# Patient Record
Sex: FEMALE | Race: WHITE | URBAN - METROPOLITAN AREA
[De-identification: names, ages, dates, MRNs, and addresses within clinical notes are randomized per-mention and may not be internally consistent; named-entity substitution may affect disease eponyms.]

---

## 2022-11-04 ENCOUNTER — OFFICE (OUTPATIENT)
Dept: URBAN - METROPOLITAN AREA CLINIC 105 | Facility: CLINIC | Age: 67
Setting detail: OPHTHALMOLOGY
End: 2022-11-04
Payer: MEDICARE

## 2022-11-04 DIAGNOSIS — H40.1131: ICD-10-CM

## 2022-11-04 DIAGNOSIS — H25.13: ICD-10-CM

## 2022-11-04 PROCEDURE — 92133 CPTRZD OPH DX IMG PST SGM ON: CPT | Performed by: OPHTHALMOLOGY

## 2022-11-04 PROCEDURE — 99213 OFFICE O/P EST LOW 20 MIN: CPT | Performed by: OPHTHALMOLOGY

## 2022-11-04 PROCEDURE — 92083 EXTENDED VISUAL FIELD XM: CPT | Performed by: OPHTHALMOLOGY

## 2022-11-04 ASSESSMENT — VISUAL ACUITY
OS_BCVA: 20/30+1
OD_BCVA: 20/30

## 2022-11-04 ASSESSMENT — AXIALLENGTH_DERIVED
OS_AL: 24.9253
OD_AL: 25.3647

## 2022-11-04 ASSESSMENT — REFRACTION_AUTOREFRACTION
OD_SPHERE: -5.00
OS_CYLINDER: -0.75
OD_CYLINDER: -2.00
OD_AXIS: 105
OS_SPHERE: -4.75
OS_AXIS: 093

## 2022-11-04 ASSESSMENT — REFRACTION_CURRENTRX
OD_CYLINDER: -0.75
OS_VPRISM_DIRECTION: SV
OD_OVR_VA: 20/
OD_SPHERE: -4.50
OS_CYLINDER: -0.25
OS_OVR_VA: 20/
OS_SPHERE: -4.75
OS_AXIS: 011
OD_AXIS: 120
OD_VPRISM_DIRECTION: SV

## 2022-11-04 ASSESSMENT — PACHYMETRY
OD_CT_UM: 498
OS_CT_CORRECTION: 4
OS_CT_UM: 487
OD_CT_CORRECTION: 4

## 2022-11-04 ASSESSMENT — KERATOMETRY
OS_K1POWER_DIOPTERS: 45.25
OS_AXISANGLE_DEGREES: 132
OD_K2POWER_DIOPTERS: 45.50
OS_K2POWER_DIOPTERS: 45.75
OD_AXISANGLE_DEGREES: 052
OD_K1POWER_DIOPTERS: 45.25

## 2022-11-04 ASSESSMENT — TONOMETRY
OS_IOP_MMHG: 21
OD_IOP_MMHG: 21

## 2022-11-04 ASSESSMENT — CONFRONTATIONAL VISUAL FIELD TEST (CVF)
OD_FINDINGS: FULL
OS_FINDINGS: FULL

## 2022-11-04 ASSESSMENT — SPHEQUIV_DERIVED
OS_SPHEQUIV: -5.125
OD_SPHEQUIV: -6

## 2023-03-03 ENCOUNTER — OFFICE (OUTPATIENT)
Dept: URBAN - METROPOLITAN AREA CLINIC 12 | Facility: CLINIC | Age: 68
Setting detail: OPHTHALMOLOGY
End: 2023-03-03
Payer: MEDICARE

## 2023-03-03 DIAGNOSIS — H90.3: ICD-10-CM

## 2023-03-03 PROCEDURE — V5261 HEARING AID, DIGIT, BIN, BTE: HCPCS | Performed by: AUDIOLOGIST-HEARING AID FITTER

## 2023-03-14 ENCOUNTER — Encounter (OUTPATIENT)
Dept: URBAN - METROPOLITAN AREA CLINIC 12 | Facility: CLINIC | Age: 68
Setting detail: OPHTHALMOLOGY
End: 2023-03-14
Payer: MEDICARE

## 2023-07-05 PROBLEM — Z00.00 ENCOUNTER FOR PREVENTIVE HEALTH EXAMINATION: Status: ACTIVE | Noted: 2023-07-05

## 2023-07-06 ENCOUNTER — APPOINTMENT (OUTPATIENT)
Dept: RHEUMATOLOGY | Facility: CLINIC | Age: 68
End: 2023-07-06
Payer: MEDICARE

## 2023-07-06 VITALS
SYSTOLIC BLOOD PRESSURE: 134 MMHG | BODY MASS INDEX: 24.66 KG/M2 | OXYGEN SATURATION: 97 % | DIASTOLIC BLOOD PRESSURE: 74 MMHG | HEIGHT: 62 IN | TEMPERATURE: 97.4 F | WEIGHT: 134 LBS | HEART RATE: 68 BPM

## 2023-07-06 DIAGNOSIS — M81.0 AGE-RELATED OSTEOPOROSIS W/OUT CURRENT PATHOLOGICAL FRACTURE: ICD-10-CM

## 2023-07-06 PROCEDURE — 99204 OFFICE O/P NEW MOD 45 MIN: CPT

## 2023-07-10 NOTE — PHYSICAL EXAM
[General Appearance - Alert] : alert [General Appearance - In No Acute Distress] : in no acute distress [General Appearance - Well Developed] : well developed [Sclera] : the sclera and conjunctiva were normal [Extraocular Movements] : extraocular movements were intact [Outer Ear] : the ears and nose were normal in appearance [Neck Appearance] : the appearance of the neck was normal [Exaggerated Use Of Accessory Muscles For Inspiration] : no accessory muscle use [Edema] : there was no peripheral edema [No Spinal Tenderness] : no spinal tenderness [Musculoskeletal - Swelling] : no joint swelling seen [Skin Color & Pigmentation] : normal skin color and pigmentation [] : no rash [Skin Lesions] : no skin lesions [No Focal Deficits] : no focal deficits [Oriented To Time, Place, And Person] : oriented to person, place, and time [Affect] : the affect was normal [Mood] : the mood was normal

## 2023-07-10 NOTE — ASSESSMENT
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 \par Pt mentions issues with gallstones and certain PO medications causing her GI upset.\par Therefore the best options for osteoporosis treatment for her would be either IV zoledronic acid once yearly or SubQ Prolia injections once every 6 months.\par Patient was given information handouts on both medications and advised to get bloodwork done in advance (CMP, Vitamin D, TSH, PTH) and will discuss over the phone which option she wishes to pursue.\par Pt also advised to take calcium and vitamin D supplements daily for osteoporosis.

## 2023-07-10 NOTE — HISTORY OF PRESENT ILLNESS
[FreeTextEntry1] : 68F here for evaluation of osteoporosis. Had a DEXA most recently 5/2022 which showed overall osteoporosis with lowest T score measuring -2.5. 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 \par s/p total hysterectomy at age 42 \par \par Denies tobacco or alcohol use history. No significant recent weight loss. No falls. \par No known history of parental osteoporosis/hip fractures.\par No PPI use history. \par No pending dental work. \par States she has issues with gallstones and certain medications taken orally can cause GI upset.  [Weight Loss] : no weight loss [Arthralgias] : no arthralgias [Joint Swelling] : no joint swelling [Difficulty Standing] : no difficulty standing [Difficulty Walking] : no difficulty walking

## 2023-10-04 ENCOUNTER — OFFICE (OUTPATIENT)
Dept: URBAN - METROPOLITAN AREA CLINIC 105 | Facility: CLINIC | Age: 68
Setting detail: OPHTHALMOLOGY
End: 2023-10-04

## 2023-10-04 DIAGNOSIS — H90.3: ICD-10-CM

## 2023-10-04 PROCEDURE — HAD HEARING AID DISPENSE: Performed by: AUDIOLOGIST-HEARING AID FITTER

## 2023-12-01 ENCOUNTER — OFFICE (OUTPATIENT)
Dept: URBAN - METROPOLITAN AREA CLINIC 105 | Facility: CLINIC | Age: 68
Setting detail: OPHTHALMOLOGY
End: 2023-12-01
Payer: MEDICARE

## 2023-12-01 DIAGNOSIS — H25.013: ICD-10-CM

## 2023-12-01 DIAGNOSIS — H40.1131: ICD-10-CM

## 2023-12-01 DIAGNOSIS — H35.40: ICD-10-CM

## 2023-12-01 DIAGNOSIS — H25.13: ICD-10-CM

## 2023-12-01 PROCEDURE — 92250 FUNDUS PHOTOGRAPHY W/I&R: CPT | Performed by: OPHTHALMOLOGY

## 2023-12-01 PROCEDURE — 92014 COMPRE OPH EXAM EST PT 1/>: CPT | Performed by: OPHTHALMOLOGY

## 2023-12-01 ASSESSMENT — REFRACTION_CURRENTRX
OS_CYLINDER: -0.25
OD_VPRISM_DIRECTION: SV
OS_VPRISM_DIRECTION: SV
OS_OVR_VA: 20/
OD_CYLINDER: -0.75
OD_SPHERE: -5.50
OS_SPHERE: -5.00
OS_AXIS: 007
OD_AXIS: 113
OD_OVR_VA: 20/

## 2023-12-01 ASSESSMENT — REFRACTION_AUTOREFRACTION
OD_SPHERE: -4.75
OD_CYLINDER: -2.00
OS_CYLINDER: -1.00
OS_AXIS: 104
OD_AXIS: 115
OS_SPHERE: -4.00

## 2023-12-01 ASSESSMENT — SPHEQUIV_DERIVED
OS_SPHEQUIV: -4.5
OD_SPHEQUIV: -5.75

## 2023-12-01 ASSESSMENT — CONFRONTATIONAL VISUAL FIELD TEST (CVF)
OS_FINDINGS: FULL
OD_FINDINGS: FULL

## 2024-01-12 ENCOUNTER — APPOINTMENT (OUTPATIENT)
Dept: RHEUMATOLOGY | Facility: CLINIC | Age: 69
End: 2024-01-12

## 2024-02-09 ENCOUNTER — APPOINTMENT (OUTPATIENT)
Dept: RHEUMATOLOGY | Facility: CLINIC | Age: 69
End: 2024-02-09

## 2024-03-08 ENCOUNTER — OFFICE (OUTPATIENT)
Dept: URBAN - METROPOLITAN AREA CLINIC 105 | Facility: CLINIC | Age: 69
Setting detail: OPHTHALMOLOGY
End: 2024-03-08
Payer: MEDICARE

## 2024-03-08 DIAGNOSIS — H25.013: ICD-10-CM

## 2024-03-08 DIAGNOSIS — H25.13: ICD-10-CM

## 2024-03-08 DIAGNOSIS — H40.1131: ICD-10-CM

## 2024-03-08 DIAGNOSIS — H25.12: ICD-10-CM

## 2024-03-08 PROBLEM — H25.11 CATARACT NUCLEAR SCLEROSIS AGE RELATED; RIGHT EYE, LEFT EYE, BOTH EYES: Status: ACTIVE | Noted: 2024-03-08

## 2024-03-08 PROCEDURE — 92136 OPHTHALMIC BIOMETRY: CPT | Mod: 26,LT | Performed by: OPHTHALMOLOGY

## 2024-03-08 PROCEDURE — 92012 INTRM OPH EXAM EST PATIENT: CPT | Performed by: OPHTHALMOLOGY

## 2024-03-08 PROCEDURE — 92136 OPHTHALMIC BIOMETRY: CPT | Mod: TC | Performed by: OPHTHALMOLOGY

## 2024-03-08 ASSESSMENT — REFRACTION_CURRENTRX
OD_AXIS: 113
OD_CYLINDER: -0.75
OS_OVR_VA: 20/
OD_OVR_VA: 20/
OS_VPRISM_DIRECTION: SV
OS_SPHERE: -5.00
OS_CYLINDER: -0.25
OD_SPHERE: -5.50
OD_VPRISM_DIRECTION: SV
OS_AXIS: 007

## 2024-06-20 ENCOUNTER — RX ONLY (RX ONLY)
Age: 69
End: 2024-06-20

## 2024-06-20 ENCOUNTER — AMBULATORY SURGERY CENTER (OUTPATIENT)
Dept: URBAN - METROPOLITAN AREA SURGERY 4 | Facility: SURGERY | Age: 69
Setting detail: OPHTHALMOLOGY
End: 2024-06-20
Payer: MEDICARE

## 2024-06-20 DIAGNOSIS — H40.1121: ICD-10-CM

## 2024-06-20 DIAGNOSIS — H25.12: ICD-10-CM

## 2024-06-20 PROCEDURE — 66988 XCAPSL CTRC RMVL W/ECP: CPT | Mod: LT | Performed by: OPHTHALMOLOGY

## 2024-06-21 ENCOUNTER — OFFICE (OUTPATIENT)
Dept: URBAN - METROPOLITAN AREA CLINIC 105 | Facility: CLINIC | Age: 69
Setting detail: OPHTHALMOLOGY
End: 2024-06-21
Payer: MEDICARE

## 2024-06-21 DIAGNOSIS — Z96.1: ICD-10-CM

## 2024-06-21 PROBLEM — H25.011 CORTICAL CATARACT;  , RIGHT EYE: Status: ACTIVE | Noted: 2024-06-21

## 2024-06-21 PROCEDURE — 99024 POSTOP FOLLOW-UP VISIT: CPT | Performed by: OPHTHALMOLOGY

## 2024-06-21 ASSESSMENT — CONFRONTATIONAL VISUAL FIELD TEST (CVF)
OD_FINDINGS: FULL
OS_FINDINGS: FULL

## 2024-07-12 ENCOUNTER — APPOINTMENT (OUTPATIENT)
Dept: RHEUMATOLOGY | Facility: CLINIC | Age: 69
End: 2024-07-12
Payer: MEDICARE

## 2024-07-12 ENCOUNTER — OFFICE (OUTPATIENT)
Dept: URBAN - METROPOLITAN AREA CLINIC 105 | Facility: CLINIC | Age: 69
Setting detail: OPHTHALMOLOGY
End: 2024-07-12
Payer: MEDICARE

## 2024-07-12 VITALS
BODY MASS INDEX: 24.66 KG/M2 | DIASTOLIC BLOOD PRESSURE: 71 MMHG | HEART RATE: 77 BPM | TEMPERATURE: 98.3 F | HEIGHT: 62 IN | SYSTOLIC BLOOD PRESSURE: 138 MMHG | WEIGHT: 134 LBS | OXYGEN SATURATION: 97 %

## 2024-07-12 DIAGNOSIS — H25.11: ICD-10-CM

## 2024-07-12 DIAGNOSIS — M81.0 AGE-RELATED OSTEOPOROSIS W/OUT CURRENT PATHOLOGICAL FRACTURE: ICD-10-CM

## 2024-07-12 DIAGNOSIS — R79.89 OTHER SPECIFIED ABNORMAL FINDINGS OF BLOOD CHEMISTRY: ICD-10-CM

## 2024-07-12 PROCEDURE — 92136 OPHTHALMIC BIOMETRY: CPT | Mod: 26,RT | Performed by: OPHTHALMOLOGY

## 2024-07-12 PROCEDURE — 99214 OFFICE O/P EST MOD 30 MIN: CPT

## 2024-07-12 ASSESSMENT — CONFRONTATIONAL VISUAL FIELD TEST (CVF)
OD_FINDINGS: FULL
OS_FINDINGS: FULL

## 2024-07-22 ENCOUNTER — AMBULATORY SURGERY CENTER (OUTPATIENT)
Dept: URBAN - METROPOLITAN AREA SURGERY 4 | Facility: SURGERY | Age: 69
Setting detail: OPHTHALMOLOGY
End: 2024-07-22
Payer: MEDICARE

## 2024-07-22 DIAGNOSIS — H25.11: ICD-10-CM

## 2024-07-22 DIAGNOSIS — H40.1131: ICD-10-CM

## 2024-07-22 PROCEDURE — 66988 XCAPSL CTRC RMVL W/ECP: CPT | Mod: 79,RT | Performed by: OPHTHALMOLOGY

## 2024-07-23 ENCOUNTER — RX ONLY (RX ONLY)
Age: 69
End: 2024-07-23

## 2024-07-23 ENCOUNTER — OFFICE (OUTPATIENT)
Dept: URBAN - METROPOLITAN AREA CLINIC 105 | Facility: CLINIC | Age: 69
Setting detail: OPHTHALMOLOGY
End: 2024-07-23
Payer: MEDICARE

## 2024-07-23 DIAGNOSIS — Z96.1: ICD-10-CM

## 2024-07-23 PROCEDURE — 99024 POSTOP FOLLOW-UP VISIT: CPT | Performed by: OPTOMETRIST

## 2024-07-23 ASSESSMENT — CONFRONTATIONAL VISUAL FIELD TEST (CVF)
OD_FINDINGS: FULL
OS_FINDINGS: FULL

## 2024-07-26 ENCOUNTER — RX ONLY (RX ONLY)
Age: 69
End: 2024-07-26

## 2024-07-26 ENCOUNTER — OFFICE (OUTPATIENT)
Dept: URBAN - METROPOLITAN AREA CLINIC 103 | Facility: CLINIC | Age: 69
Setting detail: OPHTHALMOLOGY
End: 2024-07-26
Payer: MEDICARE

## 2024-07-26 DIAGNOSIS — Z96.1: ICD-10-CM

## 2024-07-26 PROCEDURE — 99024 POSTOP FOLLOW-UP VISIT: CPT | Performed by: OPHTHALMOLOGY

## 2024-07-26 ASSESSMENT — CONFRONTATIONAL VISUAL FIELD TEST (CVF)
OS_FINDINGS: FULL
OD_FINDINGS: FULL

## 2025-05-30 ENCOUNTER — OFFICE (OUTPATIENT)
Dept: URBAN - METROPOLITAN AREA CLINIC 105 | Facility: CLINIC | Age: 70
Setting detail: OPHTHALMOLOGY
End: 2025-05-30
Payer: MEDICARE

## 2025-05-30 DIAGNOSIS — H26.493: ICD-10-CM

## 2025-05-30 DIAGNOSIS — H40.1131: ICD-10-CM

## 2025-05-30 DIAGNOSIS — H35.40: ICD-10-CM

## 2025-05-30 PROCEDURE — 92133 CPTRZD OPH DX IMG PST SGM ON: CPT | Performed by: OPHTHALMOLOGY

## 2025-05-30 PROCEDURE — 92014 COMPRE OPH EXAM EST PT 1/>: CPT | Performed by: OPHTHALMOLOGY

## 2025-05-30 ASSESSMENT — KERATOMETRY
OS_K2POWER_DIOPTERS: 45.50
OD_K2POWER_DIOPTERS: 45.25
OS_AXISANGLE_DEGREES: 144
OD_K1POWER_DIOPTERS: 44.75
OS_K1POWER_DIOPTERS: 44.75
OD_AXISANGLE_DEGREES: 070

## 2025-05-30 ASSESSMENT — REFRACTION_AUTOREFRACTION
OD_AXIS: 093
OS_CYLINDER: -1.50
OD_CYLINDER: -0.75
OD_SPHERE: -0.25
OS_AXIS: 070
OS_SPHERE: +0.25

## 2025-05-30 ASSESSMENT — REFRACTION_CURRENTRX
OD_VPRISM_DIRECTION: SV
OS_SPHERE: +2.00
OD_SPHERE: +2.00
OS_VPRISM_DIRECTION: SV
OS_OVR_VA: 20/
OD_OVR_VA: 20/

## 2025-05-30 ASSESSMENT — VISUAL ACUITY
OS_BCVA: 20/20-2
OD_BCVA: 20/25-2

## 2025-05-30 ASSESSMENT — PACHYMETRY
OD_CT_CORRECTION: 4
OS_CT_UM: 487
OD_CT_UM: 498
OS_CT_CORRECTION: 4

## 2025-05-30 ASSESSMENT — TONOMETRY
OD_IOP_MMHG: 18
OS_IOP_MMHG: 19

## 2025-05-30 ASSESSMENT — CONFRONTATIONAL VISUAL FIELD TEST (CVF)
OD_FINDINGS: FULL
OS_FINDINGS: FULL

## 2025-06-06 ENCOUNTER — RX ONLY (RX ONLY)
Age: 70
End: 2025-06-06

## 2025-06-06 ENCOUNTER — OFFICE (OUTPATIENT)
Dept: URBAN - METROPOLITAN AREA CLINIC 105 | Facility: CLINIC | Age: 70
Setting detail: OPHTHALMOLOGY
End: 2025-06-06
Payer: MEDICARE

## 2025-06-06 DIAGNOSIS — H26.491: ICD-10-CM

## 2025-06-06 PROBLEM — H26.493 POSTERIOR CAPSULAR OPACIFICATION; RIGHT EYE, LEFT EYE, BOTH EYES: Status: ACTIVE | Noted: 2025-06-06

## 2025-06-06 PROBLEM — H26.492 POSTERIOR CAPSULAR OPACIFICATION; RIGHT EYE, LEFT EYE, BOTH EYES: Status: ACTIVE | Noted: 2025-06-06

## 2025-06-06 PROCEDURE — 66821 AFTER CATARACT LASER SURGERY: CPT | Mod: RT | Performed by: OPHTHALMOLOGY

## 2025-06-06 ASSESSMENT — CONFRONTATIONAL VISUAL FIELD TEST (CVF)
OS_FINDINGS: FULL
OD_FINDINGS: FULL

## 2025-06-06 ASSESSMENT — REFRACTION_AUTOREFRACTION
OS_AXIS: 070
OD_SPHERE: -0.25
OS_SPHERE: +0.25
OD_AXIS: 093
OS_CYLINDER: -1.50
OD_CYLINDER: -0.75

## 2025-06-06 ASSESSMENT — KERATOMETRY
OD_K1POWER_DIOPTERS: 44.75
OD_AXISANGLE_DEGREES: 070
OS_AXISANGLE_DEGREES: 144
OD_K2POWER_DIOPTERS: 45.25
OS_K2POWER_DIOPTERS: 45.50
OS_K1POWER_DIOPTERS: 44.75

## 2025-06-06 ASSESSMENT — REFRACTION_CURRENTRX
OD_VPRISM_DIRECTION: SV
OS_VPRISM_DIRECTION: SV
OS_SPHERE: +2.00
OD_SPHERE: +2.00
OS_OVR_VA: 20/
OD_OVR_VA: 20/

## 2025-06-06 ASSESSMENT — VISUAL ACUITY
OD_BCVA: 20/25-2
OS_BCVA: 20/40-2

## 2025-06-18 ENCOUNTER — OFFICE (OUTPATIENT)
Dept: URBAN - METROPOLITAN AREA CLINIC 105 | Facility: CLINIC | Age: 70
Setting detail: OPHTHALMOLOGY
End: 2025-06-18

## 2025-06-18 DIAGNOSIS — H90.3: ICD-10-CM

## 2025-06-18 PROCEDURE — 92593 HEARING AID CHECK; BINAURAL: CPT | Performed by: AUDIOLOGIST-HEARING AID FITTER
